# Patient Record
Sex: FEMALE | Race: WHITE
[De-identification: names, ages, dates, MRNs, and addresses within clinical notes are randomized per-mention and may not be internally consistent; named-entity substitution may affect disease eponyms.]

---

## 2018-09-28 ENCOUNTER — HOSPITAL ENCOUNTER (OUTPATIENT)
Dept: HOSPITAL 92 - SCSULT | Age: 68
Discharge: HOME | End: 2018-09-28
Attending: INTERNAL MEDICINE
Payer: COMMERCIAL

## 2018-09-28 DIAGNOSIS — R94.5: Primary | ICD-10-CM

## 2018-09-28 DIAGNOSIS — Z90.49: ICD-10-CM

## 2018-09-28 DIAGNOSIS — R10.10: ICD-10-CM

## 2018-09-28 PROCEDURE — 76700 US EXAM ABDOM COMPLETE: CPT

## 2018-09-28 NOTE — ULT
ABDOMINAL ULTRASOUND:

 

Date:  09/28/18 

 

HISTORY:  

Abdominal pain. Elevated LFTs. 

 

FINDINGS:

Real-time images of the upper abdomen demonstrate that the gallbladder has been removed. The common d
uct is 4.0 mm. Liver shows no focal lesions. It measures approximately 15-16 cm in length. The spleen
 measures 9.9 cm. Pancreas partially obscured. Abdominal aorta and IVC regions appear unremarkable. R
ight and left kidneys are within normal limits of size and not obstructed. 

 

IMPRESSION: 

Postop cholecystectomy change. 

 

 

POS: [facility]